# Patient Record
(demographics unavailable — no encounter records)

---

## 2025-04-29 NOTE — HISTORY OF PRESENT ILLNESS
[de-identified] : Jocelyn is here for physical Started family this year.  Was diagnosed with PCOS with reproductive endocrinology to trial Clomid ovulation.  She is taking prenatal vitamin.  She is requesting MMRV titers in preparation Continues to live healthy lifestyle Works as

## 2025-04-29 NOTE — ASSESSMENT
[Vaccines Reviewed] : Immunizations reviewed today. Please see immunization details in the vaccine log within the immunization flowsheet.  [FreeTextEntry1] : Prevention -Skin check recommended -Pap utd, follows with gyn -labs ordered including mmrv titers -IMM utd -F/u one year

## 2025-04-29 NOTE — HEALTH RISK ASSESSMENT
[No] : No [0] : 2) Feeling down, depressed, or hopeless: Not at all (0) [PHQ-2 Negative - No further assessment needed] : PHQ-2 Negative - No further assessment needed [Never] : Never [Employed] : employed [] :  [Sexually Active] : sexually active [[id number]] : 0 [High Risk Behavior] : no high risk behavior [FreeTextEntry2] :